# Patient Record
Sex: FEMALE | Race: WHITE | NOT HISPANIC OR LATINO | Employment: FULL TIME | URBAN - METROPOLITAN AREA
[De-identification: names, ages, dates, MRNs, and addresses within clinical notes are randomized per-mention and may not be internally consistent; named-entity substitution may affect disease eponyms.]

---

## 2017-03-06 NOTE — TELEPHONE ENCOUNTER
Was the patient seen in the last year in this department? Yes     Does patient have an active prescription for medications requested? No     Received Request Via: Pharmacy       Last seen 07/11/2016

## 2017-05-22 ENCOUNTER — OFFICE VISIT (OUTPATIENT)
Dept: MEDICAL GROUP | Facility: MEDICAL CENTER | Age: 62
End: 2017-05-22
Payer: COMMERCIAL

## 2017-05-22 VITALS
HEART RATE: 87 BPM | SYSTOLIC BLOOD PRESSURE: 116 MMHG | WEIGHT: 112 LBS | BODY MASS INDEX: 18.66 KG/M2 | TEMPERATURE: 98.2 F | HEIGHT: 65 IN | OXYGEN SATURATION: 97 % | DIASTOLIC BLOOD PRESSURE: 68 MMHG

## 2017-05-22 DIAGNOSIS — E78.5 DYSLIPIDEMIA: ICD-10-CM

## 2017-05-22 DIAGNOSIS — G47.9 SLEEP DISORDER: ICD-10-CM

## 2017-05-22 DIAGNOSIS — E55.9 HYPOVITAMINOSIS D: ICD-10-CM

## 2017-05-22 DIAGNOSIS — Z00.00 HEALTH CARE MAINTENANCE: ICD-10-CM

## 2017-05-22 PROCEDURE — 99214 OFFICE O/P EST MOD 30 MIN: CPT | Performed by: INTERNAL MEDICINE

## 2017-05-22 RX ORDER — ZOLPIDEM TARTRATE 5 MG/1
5 TABLET ORAL NIGHTLY PRN
Qty: 30 TAB | Refills: 0 | Status: SHIPPED | OUTPATIENT
Start: 2017-05-22 | End: 2017-07-13 | Stop reason: SDUPTHER

## 2017-05-22 NOTE — PROGRESS NOTES
CHIEF COMPLAINT  Insomnia    HPI  Patient is a 61 y.o. female patient who presents today for the following     Insomnia  The patient has a trouble to go to sleep.    Previous medication:  ambien  Current medication: triazolam, low dose, wants to go back to ambien.    Discussed sleep hygiene:    - Go to bed and wake up at consistent times whether work/school day or not: yes  - Keep room dark, quiet, and comfortable: yes  - Don't have naps: yes  - Avoid stimulant or caffeine use more than 4 hours after wake time: yes    - Avoid meal or exercise 2-3 hours prior to going to bed: yes    DYSLIPIDEMIA  Meds: none.  Diet: healthy  Exercise:  daily  BMI: 18  FH: unknown.    Vitamin D deficiency  The patient had low Vitamin D.  She was on Vit D supplement, OTC.      Reviewed PMH, PSH, FH, SH, ALL, HCM/IMM, no changes  Reviewed MEDS, no changes    Patient Active Problem List    Diagnosis Date Noted   • Health care maintenance 07/11/2016   • Dyslipidemia 07/11/2016   • Sleep disorder 12/26/2014   • Vitamin D deficiency disease 11/17/2013     CURRENT MEDICATIONS  Current Outpatient Prescriptions   Medication Sig Dispense Refill   • zolpidem (AMBIEN) 5 MG Tab Take 1 Tab by mouth at bedtime as needed for Sleep. 30 Tab 0   • triazolam (HALCION) 0.25 MG Tab Take 1/2 tab for 2 weeks, then 1/4 tab for 2 weeks 30 Tab 0     No current facility-administered medications for this visit.     ALLERGIES  Allergies: Review of patient's allergies indicates no known allergies.  PAST MEDICAL HISTORY  Past Medical History   Diagnosis Date   • OSTEOPOROSIS    • Nephrolithiasis 2011     SURGICAL HISTORY  She  has past surgical history that includes colonoscopy (2007).  SOCIAL HISTORY  Social History   Substance Use Topics   • Smoking status: Former Smoker -- 1.00 packs/day for 20 years     Quit date: 12/26/1995   • Smokeless tobacco: None   • Alcohol Use: No     Social History     Social History Narrative    Teacher, english as a second language.     "Lives with , .     Has to step-children, adult.      FAMILY HISTORY  Family History   Problem Relation Age of Onset   • Cancer Mother      lung   • Stroke Father    • Hyperlipidemia Neg Hx    • Heart Disease Neg Hx    • Hyperlipidemia Neg Hx    • Hypertension Neg Hx      No family status information on file.       ROS   Constitutional: Negative for fever, chills.  HENT: Negative for congestion, sore throat.  Eyes: Negative for blurred vision.   Respiratory: Negative for cough, shortness of breath.  Cardiovascular: Negative for chest pain, palpitations.   Gastrointestinal: Negative for heartburn, nausea, abdominal pain.   Genitourinary: Negative for dysuria.  Musculoskeletal: Negative for significant myalgias, back pain and joint pain.   Skin: Negative for rash and itching.   Neuro: Negative for dizziness, weakness and headaches.   Endo/Heme/Allergies: Does not bruise/bleed easily.   Psychiatric/Behavioral: Negative for depression, anxiety    PHYSICAL EXAM   Blood pressure 116/68, pulse 87, temperature 36.8 °C (98.2 °F), height 1.651 m (5' 5\"), weight 50.803 kg (112 lb), SpO2 97 %. Body mass index is 18.64 kg/(m^2).  General:  NAD, well appearing  HEENT:   NC/AT, PERRLA, EOMI, TMs are clear. Oropharyngeal mucosa is pink,  without lesions;  no cervical / supraclavicular  lymphadenopathy, no thyromegaly.    Cardiovascular: RRR.   No m/r/g. No carotid bruits .      Lungs:   CTAB, no w/r/r, no respiratory distress.  Abdomen: Soft, NT/ND + BS; no suprapubic tenderness; no masses or hepatosplenomegaly.  Extremities:  2+ DP and radial pulses bilaterally.  No c/c/e.   Skin:  Warm, dry.  No erythema. No rash.   Neurologic: Alert & oriented x 3.  No focal deficits.  Psychiatric:  Affect normal, mood normal, judgment normal.    LABS     Labs are reviewed and discussed with a patient    Lab Results   Component Value Date/Time    CHOLESTEROL,* 05/12/2016 09:30 AM    * 05/12/2016 09:30 AM    HDL 56 " 05/12/2016 09:30 AM    TRIGLYCERIDES 264* 05/12/2016 09:30 AM       Lab Results   Component Value Date/Time    SODIUM 138 05/12/2016 09:30 AM    POTASSIUM 4.2 05/12/2016 09:30 AM    CHLORIDE 105 05/12/2016 09:30 AM    CO2 27 05/12/2016 09:30 AM    GLUCOSE 80 05/12/2016 09:30 AM    BUN 10 05/12/2016 09:30 AM    CREATININE 0.68 05/12/2016 09:30 AM     Lab Results   Component Value Date/Time    ALKALINE PHOSPHATASE 82 05/12/2016 09:30 AM    AST(SGOT) 26 05/12/2016 09:30 AM    ALT(SGPT) 25 05/12/2016 09:30 AM    TOTAL BILIRUBIN 0.3 05/12/2016 09:30 AM      Lab Results   Component Value Date/Time    GLYCOHEMOGLOBIN 5.4 05/12/2016 09:30 AM     No results found for: TSH  Lab Results   Component Value Date/Time    FREE T-4 1.24 02/11/2014 06:34 AM      Ref. Range 5/7/2015 09:16 5/12/2016 09:30   25-Hydroxy   Vitamin D 25 :  ng/mL 14 (L) 12 (L)     Lab Results   Component Value Date/Time    WBC 5.1 05/12/2016 09:30 AM    RBC 4.66 05/12/2016 09:30 AM    HEMOGLOBIN 13.6 05/12/2016 09:30 AM    HEMATOCRIT 41.8 05/12/2016 09:30 AM    MCV 89.7 05/12/2016 09:30 AM    MCH 29.2 05/12/2016 09:30 AM    MCHC 32.5* 05/12/2016 09:30 AM    MPV 10.1 05/12/2016 09:30 AM    NEUTROPHILS-POLYS 47.7 11/12/2013 07:36 AM    LYMPHOCYTES 44.0* 11/12/2013 07:36 AM    MONOCYTES 6.7 11/12/2013 07:36 AM    EOSINOPHILS 1.1 11/12/2013 07:36 AM    BASOPHILS 0.5 11/12/2013 07:36 AM    HYPOCHROMIA 1+ 11/12/2013 07:36 AM        IMAGING     None    ASSESMENT AND PLAN        1. Sleep disorder  Ambien, 5 mg, dispensed 30 tablets.  Take one tablet as needed for sleep.   3.  Is alone, 0.25 mg, tapering    2. Dyslipidemia  Continue low-fat diet, daily exercise  - COMP METABOLIC PANEL; Future  - LIPID PROFILE; Future    3. Hypovitaminosis D  Continue over-the-counter vitamin D supplement  - VITAMIN D,25 HYDROXY; Future    4. Health care maintenance  Advised shingles shot and TDAP.   Advised to schedule Pap smear with the next 6 months.    Counseling:   -  Smoking:  Nonsmoker    Followup: Return in about 3 months (around 8/22/2017), or if symptoms worsen or fail to improve, for Short.    All questions are answered.    Please note that this dictation was created using voice recognition software, and that there might be errors of naty and possibly content.

## 2017-05-22 NOTE — MR AVS SNAPSHOT
"        Ramónmariano Gutiérrez   2017 10:40 AM   Office Visit   MRN: [id number]    Department:  [place] [address]   Dept Phone:  [phone]    Description:  Female : 1955   Provider:  Benji Selby M.D.           Allergies as of 2017     No Known Allergies      You were diagnosed with     Sleep disorder   [856289]       Dyslipidemia   [496067]       Hypovitaminosis D   [642899]         Vital Signs     Blood Pressure Pulse Temperature Height Weight Body Mass Index    116/68 mmHg 87 36.8 °C (98.2 °F) 1.651 m (5' 5\") 50.803 kg (112 lb) 18.64 kg/m2    Oxygen Saturation Smoking Status                97% Former Smoker          Basic Information     Date Of Birth Sex Race Ethnicity Preferred Language    1955 Female White Non- English      Your appointments     Aug 21, 2017 10:40 AM   Established Patient with Benji Selby M.D.   [facility])    [address]  [address]  [place] [id number] [phone]           You will be receiving a confirmation call a few days before your appointment from our automated call confirmation system.              Problem List              ICD-10-CM Priority Class Noted - Resolved    Vitamin D deficiency disease E55.9   2013 - Present    Sleep disorder G47.9   2014 - Present    Health care maintenance Z00.00   2016 - Present    Dyslipidemia E78.5   2016 - Present      Health Maintenance        Date Due Completion Dates    IMM DTaP/Tdap/Td Vaccine (1 - Tdap) 1974 ---    IMM ZOSTER VACCINE 2015 ---    PAP SMEAR 10/21/2016 10/21/2013, 2009    COLONOSCOPY 2017 (Done)    Override on 2007: Done    MAMMOGRAM 10/13/2017 10/13/2016, 10/8/2015, 6/3/2015 (Postponed), 2013, 2011, 2010, 2009, 2009, 2008, 2008, 2007, 2007, 2006    Override on 6/3/2015: Postponed            Current Immunizations     Influenza " TIV (IM) 10/15/2014    MMR Vaccine 1/12/2004, 12/8/2003      Below and/or attached are the medications your provider expects you to take. Review all of your home medications and newly ordered medications with your provider and/or pharmacist. Follow medication instructions as directed by your provider and/or pharmacist. Please keep your medication list with you and share with your provider. Update the information when medications are discontinued, doses are changed, or new medications (including over-the-counter products) are added; and carry medication information at all times in the event of emergency situations     Allergies:  No Known Allergies          Medications  Valid as of: May 22, 2017 - 11:04 AM    Generic Name Brand Name Tablet Size Instructions for use    Triazolam (Tab) HALCION 0.25 MG Take 1/2 tab for 2 weeks, then 1/4 tab for 2 weeks        Zolpidem Tartrate (Tab) AMBIEN 5 MG Take 1 Tab by mouth at bedtime as needed for Sleep.        .                 Medicines prescribed today were sent to:     [facility] [address] [place] [address]), [place]  [address] [address] [address] () [place] [address]    Phone: [phone] Fax: [phone]    Open 24 Hours?: No      Medication refill instructions:       If your prescription bottle indicates you have medication refills left, it is not necessary to call your provider’s office. Please contact your pharmacy and they will refill your medication.    If your prescription bottle indicates you do not have any refills left, you may request refills at any time through one of the following ways: The online Rezzie system (except Urgent Care), by calling your provider’s office, or by asking your pharmacy to contact your provider’s office with a refill request. Medication refills are processed only during regular business hours and may not be available until the next business day. Your provider may request additional information or to have a follow-up visit with you  prior to refilling your medication.   *Please Note: Medication refills are assigned a new Rx number when refilled electronically. Your pharmacy may indicate that no refills were authorized even though a new prescription for the same medication is available at the pharmacy. Please request the medicine by name with the pharmacy before contacting your provider for a refill.        Your To Do List     Future Labs/Procedures Complete By Expires    COMP METABOLIC PANEL  As directed 5/23/2018    LIPID PROFILE  As directed 5/23/2018    VITAMIN D,25 HYDROXY  As directed 5/23/2018         UI Robot Access Code: [email or web address]  Expires: 6/21/2017 11:04 AM    UI Robot  A secure, online tool to manage your health information     [employer]’s UI Robot® is a secure, online tool that connects you to your personalized health information from the privacy of your home -- day or night - making it very easy for you to manage your healthcare. Once the activation process is completed, you can even access your medical information using the UI Robot dayo, which is available for free in the Apple Dayo store or Google Play store.     UI Robot provides the following levels of access (as shown below):   My Chart Features   [facility]  Specialists [facility]  Urgent  Care Non-[facility]  Primary Care  Doctor   Email your healthcare team securely and privately 24/7 X X X    Manage appointments: schedule your next appointment; view details of past/upcoming appointments X      Request prescription refills. X      View recent personal medical records, including lab and immunizations X X X X   View health record, including health history, allergies, medications X X X X   Read reports about your outpatient visits, procedures, consult and ER notes X X X X   See your discharge summary, which is a recap of your hospital and/or ER visit that includes your diagnosis, lab results, and care plan. X X       How to register for UI Robot:  1. Go to   [email or web address].  2. Click on the Sign Up Now box, which takes you to the New Member Sign Up page. You will need to provide the following information:  a. Enter your [employer] Spirits Access Code exactly as it appears at the top of this page. (You will not need to use this code after you’ve completed the sign-up process. If you do not sign up before the expiration date, you must request a new code.)   b. Enter your date of birth.   c. Enter your home email address.   d. Click Submit, and follow the next screen’s instructions.  3. Create a Innovation Spirits ID. This will be your Innovation Spirits login ID and cannot be changed, so think of one that is secure and easy to remember.  4. Create a [employer] password. You can change your password at any time.  5. Enter your Password Reset Question and Answer. This can be used at a later time if you forget your password.   6. Enter your e-mail address. This allows you to receive e-mail notifications when new information is available in Innovation Spirits.  7. Click Sign Up. You can now view your health information.    For assistance activating your Innovation Spirits account, call [phone]

## 2017-07-13 RX ORDER — ZOLPIDEM TARTRATE 5 MG/1
TABLET ORAL
Qty: 30 TAB | Refills: 0 | Status: SHIPPED | OUTPATIENT
Start: 2017-07-13 | End: 2017-08-22 | Stop reason: SDUPTHER

## 2017-07-13 NOTE — TELEPHONE ENCOUNTER
Was the patient seen in the last year in this department? Yes     Does patient have an active prescription for medications requested? Yes     Received Request Via: Pharmacy     Last office visit: 05/22/2017  Last labs: 05/16/2016

## 2017-08-21 ENCOUNTER — TELEPHONE (OUTPATIENT)
Dept: MEDICAL GROUP | Facility: MEDICAL CENTER | Age: 62
End: 2017-08-21

## 2017-08-21 NOTE — TELEPHONE ENCOUNTER
ESTABLISHED PATIENT PRE-VISIT PLANNING     Note: Patient will not be contacted if there is no indication to call.     1.  Reviewed notes from the last few office visits within the medical group: Yes 05/22/2017    2.  If any orders were placed at last visit or intended to be done for this visit (i.e. 6 mos follow-up), do we have Results/Consult Notes?        •  Labs - Not completed        •  Imaging - Imaging was not ordered at last office visit.       •  Referrals - No referrals were ordered at last office visit.    3. Is this appointment scheduled as a Hospital Follow-Up? No    4.  Immunizations were updated in Epic using WebIZ?: Epic matches WebIZ       •  Web Iz Recommendations: FLU and TDAP    5.  Patient is due for the following Health Maintenance Topics:   Health Maintenance Due   Topic Date Due   • IMM DTaP/Tdap/Td Vaccine (1 - Tdap) 09/23/1974   • PAP SMEAR  10/21/2016   • COLONOSCOPY 01/01/2017   • IMM INFLUENZA (1) 09/01/2017       Please have patient sign records request for colonoscopy in letters.     6.  Patient was NOT informed to arrive 15 min prior to their scheduled appointment and bring in their medication bottles.

## 2017-08-21 NOTE — Clinical Note
[id number]  Benji Selby M.D.  [address]  [place] [address]  Fax: [phone]   Authorization for Release/Disclosure of   Protected Health Information   Name: EMIR LANDIS : 1955 SSN: [id number]   Address: [address] [place] [address]  [place] [address] Phone:    [phone] (home)    I authorize the entity listed below to release/disclose the PHI below to:   [facility]/Benji Selby M.D. and Benji Selby M.D.   Provider or Entity Name:                                                   Christiano Peterson M.D.     Address   [facility], [facility]   Phone:      Fax:  [phone]   Reason for request: continuity of care   Information to be released:    [XX  ] LAST COLONOSCOPY,  including any PATH REPORT and follow-up  [  ] LAST FIT/COLOGUARD RESULT [  ] LAST DEXA  [  ] LAST MAMMOGRAM  [  ] LAST PAP  [  ] LAST LABS [  ] RETINA EXAM REPORT  [  ] IMMUNIZATION RECORDS  [  ] Release all info      [  ] Check here and initial the line next to each item to release ALL health information INCLUDING  _____ Care and treatment for drug and / or alcohol abuse  _____ HIV testing, infection status, or AIDS  _____ Genetic Testing    DATES OF SERVICE OR TIME PERIOD TO BE DISCLOSED: _____________  I understand and acknowledge that:  * This Authorization may be revoked at any time by you in writing, except if your health information has already been used or disclosed.  * Your health information that will be used or disclosed as a result of you signing this authorization could be re-disclosed by the recipient. If this occurs, your re-disclosed health information may no longer be protected by State or Federal laws.  * You may refuse to sign this Authorization. Your refusal will not affect your ability to obtain treatment.  * This Authorization becomes effective upon signing and will  on (date) __________.      If no date is indicated, this Authorization will  one (1) year from the signature  date.    Name: Josette Gutiérrez    Signature:   Date:     8/22/2017       PLEASE FAX REQUESTED RECORDS BACK TO: [phone]

## 2017-08-22 ENCOUNTER — OFFICE VISIT (OUTPATIENT)
Dept: MEDICAL GROUP | Facility: MEDICAL CENTER | Age: 62
End: 2017-08-22
Payer: COMMERCIAL

## 2017-08-22 VITALS
OXYGEN SATURATION: 97 % | HEIGHT: 65 IN | HEART RATE: 81 BPM | BODY MASS INDEX: 20.09 KG/M2 | WEIGHT: 120.6 LBS | TEMPERATURE: 97.2 F | SYSTOLIC BLOOD PRESSURE: 116 MMHG | DIASTOLIC BLOOD PRESSURE: 68 MMHG

## 2017-08-22 DIAGNOSIS — Z12.11 SCREENING FOR MALIGNANT NEOPLASM OF COLON: ICD-10-CM

## 2017-08-22 DIAGNOSIS — G47.9 SLEEP DISORDER: ICD-10-CM

## 2017-08-22 DIAGNOSIS — Z00.00 HEALTH CARE MAINTENANCE: ICD-10-CM

## 2017-08-22 DIAGNOSIS — Z12.39 SCREENING FOR MALIGNANT NEOPLASM OF BREAST: ICD-10-CM

## 2017-08-22 PROCEDURE — 99214 OFFICE O/P EST MOD 30 MIN: CPT | Performed by: INTERNAL MEDICINE

## 2017-08-22 RX ORDER — ZOLPIDEM TARTRATE 5 MG/1
TABLET ORAL
Qty: 30 TAB | Refills: 2 | Status: SHIPPED
Start: 2017-08-22 | End: 2017-10-16 | Stop reason: SDUPTHER

## 2017-08-22 ASSESSMENT — PATIENT HEALTH QUESTIONNAIRE - PHQ9: CLINICAL INTERPRETATION OF PHQ2 SCORE: 0

## 2017-08-22 NOTE — PROGRESS NOTES
CHIEF COMPLAINT  Chief Complaint   Patient presents with   • Medication Refill   Insomnia    HPI  Patient is a 61 y.o. female patient who presents today for the following     Insomnia  The patient has a trouble to go to sleep.    Previous medication:  ambien, 5 mg HS, triazolam, 0.25 mg HS; needs refill  Current medication: triazolam, low dose, wants to go back to ambien.    Discussed sleep hygiene:    - Go to bed and wake up at consistent times whether work/school day or not: yes  - Keep room dark, quiet, and comfortable: yes  - Don't have naps: yes  - Avoid stimulant or caffeine use more than 4 hours after wake time: yes    - Avoid meal or exercise 2-3 hours prior to going to bed: yes    Reviewed PMH, PSH, FH, SH, ALL, HCM/IMM, no changes  Reviewed MEDS, no changes    Patient Active Problem List    Diagnosis Date Noted   • Health care maintenance 07/11/2016   • Dyslipidemia 07/11/2016   • Sleep disorder 12/26/2014   • Vitamin D deficiency disease 11/17/2013     CURRENT MEDICATIONS  Current Outpatient Prescriptions   Medication Sig Dispense Refill   • TRIAZOLAM PO Take  by mouth.     • zolpidem (AMBIEN) 5 MG Tab TAKE ONE TABLET BY MOUTH ONCE DAILY AT BEDTIME AS NEEDED FOR SLEEP 30 Tab 2   • triazolam (HALCION) 0.25 MG Tab Take 1/2 tab for 2 weeks, then 1/4 tab for 2 weeks 30 Tab 2     No current facility-administered medications for this visit.     ALLERGIES  Allergies: Review of patient's allergies indicates no known allergies.    PAST MEDICAL HISTORY  Past Medical History   Diagnosis Date   • OSTEOPOROSIS    • Nephrolithiasis 2011   • Insomnia      SURGICAL HISTORY  She  has past surgical history that includes colonoscopy (2007).    SOCIAL HISTORY  Social History   Substance Use Topics   • Smoking status: Former Smoker -- 1.00 packs/day for 20 years     Quit date: 12/26/1995   • Smokeless tobacco: Never Used   • Alcohol Use: No     Social History     Social History Narrative    Teacher, english as a second  "language.    Lives with , .     Has to step-children, adult.      FAMILY HISTORY  Family History   Problem Relation Age of Onset   • Cancer Mother      lung   • Stroke Father    • Hyperlipidemia Neg Hx    • Heart Disease Neg Hx    • Hyperlipidemia Neg Hx    • Hypertension Neg Hx      No family status information on file.     ROS   Constitutional: Negative for fever, chills.  HENT: Negative for congestion, sore throat.  Eyes: Negative for blurred vision.   Respiratory: Negative for cough, shortness of breath.  Cardiovascular: Negative for chest pain, palpitations.   Gastrointestinal: Negative for heartburn, nausea, abdominal pain.   Musculoskeletal: Negative for significant myalgias, back pain and joint pain.   Skin: Negative for rash and itching.   Neuro: Negative for dizziness, weakness and headaches.   Endo/Heme/Allergies: Does not bruise/bleed easily.   Psychiatric/Behavioral: Negative for depression, anxiety. And per HPI.    PHYSICAL EXAM   Blood pressure 116/68, pulse 81, temperature 36.2 °C (97.2 °F), height 1.651 m (5' 5\"), weight 54.704 kg (120 lb 9.6 oz), SpO2 97 %. Body mass index is 20.07 kg/(m^2).  General:  NAD, well appearing  HEENT:   NC/AT, PERRLA, EOMI, TMs are clear. Oropharyngeal mucosa is pink,  without lesions;  no cervical / supraclavicular  lymphadenopathy, no thyromegaly.    Cardiovascular: RRR.   No m/r/g. No carotid bruits .      Lungs:   CTAB, no w/r/r, no respiratory distress.  Abdomen: Soft, NT/ND + BS.  Extremities:  2+ DP and radial pulses bilaterally.  No c/c/e.   Skin:  Warm, dry.  No erythema. No rash.   Neurologic: Alert & oriented x 3.No focal deficits.  Psychiatric:  Affect normal, mood normal, judgment normal.    LABS     None    IMAGING     None    ASSESMENT AND PLAN        1. Sleep disorder  Refill:  - zolpidem (AMBIEN) 5 MG Tab; TAKE ONE TABLET BY MOUTH ONCE DAILY AT BEDTIME AS NEEDED FOR SLEEP  Dispense: 30 Tab; Refill: 2  - triazolam (HALCION) 0.25 MG Tab; " Take 1/2 tab for 2 weeks, then 1/4 tab for 2 weeks  Dispense: 30 Tab; Refill: 2    - Reviewed effects and side effects of medication, the patient verbalized understanding    2. Screening for malignant neoplasm of colon  - REFERRAL TO GI FOR COLONOSCOPY    3. Health care maintenance  UTD    Counseling:   - Smoking:  Nonsmoker    Followup: Return in about 3 months (around 11/22/2017) for Short.    All questions are answered.    Please note that this dictation was created using voice recognition software, and that there might be errors of naty and possibly content.

## 2017-10-06 ENCOUNTER — TELEPHONE (OUTPATIENT)
Dept: MEDICAL GROUP | Facility: MEDICAL CENTER | Age: 62
End: 2017-10-06

## 2017-10-06 NOTE — TELEPHONE ENCOUNTER
Phone Number Called: [phone] (home)       Message: Please discontinue triazolam. Patient states she is no longer taking it.     Left Message for patient to call back: yes

## 2017-10-16 ENCOUNTER — HOSPITAL ENCOUNTER (OUTPATIENT)
Dept: RADIOLOGY | Facility: MEDICAL CENTER | Age: 62
End: 2017-10-16
Attending: INTERNAL MEDICINE
Payer: COMMERCIAL

## 2017-10-16 ENCOUNTER — TELEPHONE (OUTPATIENT)
Dept: MEDICAL GROUP | Facility: MEDICAL CENTER | Age: 62
End: 2017-10-16

## 2017-10-16 DIAGNOSIS — Z12.39 SCREENING FOR MALIGNANT NEOPLASM OF BREAST: ICD-10-CM

## 2017-10-16 DIAGNOSIS — G47.9 SLEEP DISORDER: ICD-10-CM

## 2017-10-16 PROCEDURE — G0202 SCR MAMMO BI INCL CAD: HCPCS

## 2017-10-16 RX ORDER — ZOLPIDEM TARTRATE 10 MG/1
TABLET ORAL
Qty: 15 TAB | Status: SHIPPED | OUTPATIENT
Start: 2017-10-16 | End: 2018-04-12

## 2017-10-16 NOTE — TELEPHONE ENCOUNTER
VOICEMAIL  1. Caller Name: Josette Gutiérrez                      Call Back Number: [phone] (home)      2. Message: Pt called saying that her insurance does not cover the medication and the pharmacy only allowed her 15 pills. She would like to know if you could either switch her medication or you could approve her for a larger amount of tablets     3. Patient approves office to leave a detailed voicemail/MyChart message: N\A

## 2017-10-17 ENCOUNTER — TELEPHONE (OUTPATIENT)
Dept: MEDICAL GROUP | Facility: MEDICAL CENTER | Age: 62
End: 2017-10-17

## 2017-10-17 NOTE — TELEPHONE ENCOUNTER
Phone Number Called: [phone] (home)       Message:  informed of msg. I will still mail out letter.     Left Message for patient to call back: yes

## 2017-10-17 NOTE — TELEPHONE ENCOUNTER
----- Message from Benji Selby M.D. sent at 10/16/2017 10:10 PM PDT -----  Please, notify pt:  Mammogram was normal, and follow up in one year is recommended.      Thanks,   Dr. Scott

## 2018-03-08 DIAGNOSIS — G47.9 SLEEP DISORDER: ICD-10-CM

## 2018-04-09 NOTE — TELEPHONE ENCOUNTER
Phone Number Called: [phone] (home)      Message: Left message for patient that she need an appointment to get medciation refilled.     Left Message for patient to call back: yes

## 2018-04-12 ENCOUNTER — OFFICE VISIT (OUTPATIENT)
Dept: MEDICAL GROUP | Facility: MEDICAL CENTER | Age: 63
End: 2018-04-12
Payer: COMMERCIAL

## 2018-04-12 VITALS
RESPIRATION RATE: 16 BRPM | HEART RATE: 68 BPM | DIASTOLIC BLOOD PRESSURE: 64 MMHG | BODY MASS INDEX: 20.43 KG/M2 | HEIGHT: 65 IN | SYSTOLIC BLOOD PRESSURE: 112 MMHG | OXYGEN SATURATION: 97 % | TEMPERATURE: 98.2 F | WEIGHT: 122.6 LBS

## 2018-04-12 DIAGNOSIS — E78.5 DYSLIPIDEMIA: ICD-10-CM

## 2018-04-12 DIAGNOSIS — Z79.899 CONTROLLED SUBSTANCE AGREEMENT SIGNED: Primary | ICD-10-CM

## 2018-04-12 DIAGNOSIS — G47.9 SLEEP DISORDER: ICD-10-CM

## 2018-04-12 DIAGNOSIS — Z00.00 HEALTH CARE MAINTENANCE: ICD-10-CM

## 2018-04-12 DIAGNOSIS — E55.9 VITAMIN D DEFICIENCY DISEASE: ICD-10-CM

## 2018-04-12 PROCEDURE — 99214 OFFICE O/P EST MOD 30 MIN: CPT | Performed by: INTERNAL MEDICINE

## 2018-04-12 NOTE — PROGRESS NOTES
CC: Josette Gutiérrez is a 62 y.o. female who presents for the following     Insomnia  The patient has a trouble to go to sleep.    Previous rx: ambien  Current rx:  triazolam, 0.25 mg HS; needs refill.     Discussed sleep hygiene:    - Go to bed and wake up at consistent times whether work/school day or not: yes  - Keep room dark, quiet, and comfortable: yes  - Don't have naps: yes  - Avoid stimulant or caffeine use more than 4 hours after wake time: yes    - Avoid meal or exercise 2-3 hours prior to going to bed: yes     DYSLIPIDEMIA  Meds: none.  Diet: healthy  Exercise:  daily  BMI: 20  FH: unknown.     Ref. Range 5/12/2016    Cholesterol,Tot Latest Ref Range: 100 - 199 mg/dL 233 (H)   Triglycerides Latest Ref Range: 0 - 149 mg/dL 264 (H)   HDL Latest Ref Range: >=40 mg/dL 56   LDL Latest Ref Range: <100 mg/dL 124 (H)     Hypovitaminosis D  The patient had low vitamin D level (5/2016) at 12.  Vitamin D supplements: 2000 units QD, OTC    Current Outpatient Prescriptions   Medication Sig Dispense Refill   • zolpidem (AMBIEN) 10 MG Tab TAKE ONE TABLET BY MOUTH ONCE DAILY AT BEDTIME AS NEEDED FOR SLEEP 15 Tab 0R   • TRIAZOLAM PO Take  by mouth.       No current facility-administered medications for this visit.      She  has a past medical history of Insomnia; Nephrolithiasis (2011); and OSTEOPOROSIS. She also has no past medical history of Breast cancer (CMS-HCC).  She  has a past surgical history that includes colonoscopy (2007).  Social History   Substance Use Topics   • Smoking status: Former Smoker     Packs/day: 1.00     Years: 20.00     Quit date: 12/26/1995   • Smokeless tobacco: Never Used   • Alcohol use No     Social History     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     Has to step-children, adult.      Family History   Problem Relation Age of Onset   • Cancer Mother      lung   • Stroke Father    • Hyperlipidemia Neg Hx    • Heart Disease Neg Hx   "  • Hypertension Neg Hx      Family Status   Relation Status   • Mother    • Father    • Neg Hx      ROS   Taking supplements to help mood or symptoms: yes  Using alcohol or other substances to help mood or symptoms: no  No polydipsia, polyuria.  No temperature intolerance.  No bowel changes  Denies symptoms of jonatan: grandiosity, euphoria, need for little or no sleep, rapid pressured speech, spending sprees, reckless or risky behavior, hypersexual behavior.   No visual or auditory hallucinations.    PHYSICAL EXAM   Blood pressure 112/64, pulse 68, temperature 36.8 °C (98.2 °F), resp. rate 16, height 1.651 m (5' 5\"), weight 55.6 kg (122 lb 9.6 oz), SpO2 97 %.  General: normal speech pattern and content   Clothing and grooming normal.  Behavior: no psychomotor abnormalities or impulsivity  Eye contact: good   Affect: normal  Though content: normal insight and reasoning, no evidence of psychotic process.   Neck/Thyroid: No adenopathy, no palpable thyroid nodules.  Lungs: CTAB  Heart: RRR no ectopy  Neuro: Gait normal. Reflexes normal and symmetric. Sensation grossly intact        Abnormal findings: none    ASSESMENT AND PLAN     1. Sleep disorder  Refill:  - triazolam (HALCION) 0.25 MG Tab; Take 1 tab HS prn  Dispense: 30 Tab; Refill: 2  2. Controlled substance agreement signed  - Controlled Substance Treatment Agreement    Obtained and reviewed patient utilization report from [facility] pharmacy database on 4/12/2018 11:18 AM  prior to writing prescription for controlled substance II, III or IV per [place] bill . Based on assessment of the report, the prescription is medically necessary.     3. Dyslipidemia  No medications, advised low-fat diet, daily exercise.   BMI is low  - COMP METABOLIC PANEL; Future  - LIPID PROFILE; Future    4. Vitamin D deficiency disease  - VITAMIN D,25 HYDROXY; Future  Continue current supplement, pending labs.    5. Health care maintenance  She will schedule Pap smear with next " office visit in 3 months    Smoking Counseling: Nonsmoker    Follow up: in 3 months    Please note that this dictation was created using voice recognition software. Despite all efforts, some minor grammar mistakes are possible.

## 2018-06-10 DIAGNOSIS — G47.9 SLEEP DISORDER: ICD-10-CM

## 2018-06-11 RX ORDER — ZOLPIDEM TARTRATE 10 MG/1
TABLET ORAL
Qty: 15 TAB | Refills: 0 | Status: SHIPPED
Start: 2018-06-11 | End: 2018-07-11

## 2018-07-05 ENCOUNTER — HOSPITAL ENCOUNTER (OUTPATIENT)
Dept: LAB | Facility: MEDICAL CENTER | Age: 63
End: 2018-07-05
Attending: INTERNAL MEDICINE
Payer: COMMERCIAL

## 2018-07-05 DIAGNOSIS — E78.5 DYSLIPIDEMIA: ICD-10-CM

## 2018-07-05 DIAGNOSIS — E55.9 VITAMIN D DEFICIENCY DISEASE: ICD-10-CM

## 2018-07-05 LAB — 25(OH)D3 SERPL-MCNC: 41 NG/ML (ref 30–100)

## 2018-07-05 PROCEDURE — 82306 VITAMIN D 25 HYDROXY: CPT

## 2018-07-05 PROCEDURE — 80053 COMPREHEN METABOLIC PANEL: CPT

## 2018-07-05 PROCEDURE — 36415 COLL VENOUS BLD VENIPUNCTURE: CPT

## 2018-07-05 PROCEDURE — 80061 LIPID PANEL: CPT

## 2018-07-06 LAB
ALBUMIN SERPL BCP-MCNC: 3.9 G/DL (ref 3.2–4.9)
ALBUMIN/GLOB SERPL: 1.5 G/DL
ALP SERPL-CCNC: 76 U/L (ref 30–99)
ALT SERPL-CCNC: 14 U/L (ref 2–50)
ANION GAP SERPL CALC-SCNC: 6 MMOL/L (ref 0–11.9)
AST SERPL-CCNC: 18 U/L (ref 12–45)
BILIRUB SERPL-MCNC: 0.3 MG/DL (ref 0.1–1.5)
BUN SERPL-MCNC: 15 MG/DL (ref 8–22)
CALCIUM SERPL-MCNC: 8.8 MG/DL (ref 8.5–10.5)
CHLORIDE SERPL-SCNC: 106 MMOL/L (ref 96–112)
CHOLEST SERPL-MCNC: 151 MG/DL (ref 100–199)
CO2 SERPL-SCNC: 27 MMOL/L (ref 20–33)
CREAT SERPL-MCNC: 0.89 MG/DL (ref 0.5–1.4)
GLOBULIN SER CALC-MCNC: 2.6 G/DL (ref 1.9–3.5)
GLUCOSE SERPL-MCNC: 75 MG/DL (ref 65–99)
HDLC SERPL-MCNC: 85 MG/DL
LDLC SERPL CALC-MCNC: 57 MG/DL
POTASSIUM SERPL-SCNC: 3.8 MMOL/L (ref 3.6–5.5)
PROT SERPL-MCNC: 6.5 G/DL (ref 6–8.2)
SODIUM SERPL-SCNC: 139 MMOL/L (ref 135–145)
TRIGL SERPL-MCNC: 46 MG/DL (ref 0–149)

## 2018-07-12 ENCOUNTER — OFFICE VISIT (OUTPATIENT)
Dept: MEDICAL GROUP | Facility: MEDICAL CENTER | Age: 63
End: 2018-07-12
Payer: COMMERCIAL

## 2018-07-12 VITALS
WEIGHT: 123.24 LBS | TEMPERATURE: 98 F | OXYGEN SATURATION: 97 % | HEART RATE: 80 BPM | DIASTOLIC BLOOD PRESSURE: 62 MMHG | HEIGHT: 65 IN | SYSTOLIC BLOOD PRESSURE: 102 MMHG | BODY MASS INDEX: 20.53 KG/M2

## 2018-07-12 DIAGNOSIS — Z79.899 CONTROLLED SUBSTANCE AGREEMENT SIGNED: ICD-10-CM

## 2018-07-12 DIAGNOSIS — Z00.00 HEALTH CARE MAINTENANCE: ICD-10-CM

## 2018-07-12 DIAGNOSIS — E78.5 DYSLIPIDEMIA: ICD-10-CM

## 2018-07-12 DIAGNOSIS — G47.9 SLEEP DISORDER: ICD-10-CM

## 2018-07-12 PROCEDURE — 99214 OFFICE O/P EST MOD 30 MIN: CPT | Performed by: INTERNAL MEDICINE

## 2018-07-12 NOTE — PROGRESS NOTES
CHIEF COMPLAINT  Chief Complaint   Patient presents with   • Follow-Up     LAbs   • Medication Refill   Insomnia    HPI  Patient is a 62 y.o. female patient who presents today for the following     Insomnia  The patient has a trouble to go to sleep.    Previous rx: ambien  Current rx:  triazolam, 0.25 mg HS; needs refill.     Discussed sleep hygiene:    - Go to bed and wake up at consistent times whether work/school day or not: yes  - Keep room dark, quiet, and comfortable: yes  - Don't have naps: yes  - Avoid stimulant or caffeine use more than 4 hours after wake time: yes    - Avoid meal or exercise 2-3 hours prior to going to bed: yes     DYSLIPIDEMIA  Meds: none.  Diet: healthy  Exercise:  daily  BMI: 20  FH: unknown.     Reviewed PMH, PSH, FH, SH, ALL, HCM/IMM, no changes  Reviewed MEDS, no changes    Patient Active Problem List    Diagnosis Date Noted   • Controlled substance agreement signed 04/12/2018   • Health care maintenance 07/11/2016   • Dyslipidemia 07/11/2016   • Sleep disorder 12/26/2014     CURRENT MEDICATIONS  Current Outpatient Prescriptions   Medication Sig Dispense Refill   • triazolam (HALCION) 0.25 MG Tab Take 1 tab HS prn 30 Tab 2     No current facility-administered medications for this visit.      ALLERGIES  Allergies: Patient has no known allergies.  PAST MEDICAL HISTORY  Past Medical History:   Diagnosis Date   • Nephrolithiasis 2011   • Insomnia    • OSTEOPOROSIS      SURGICAL HISTORY  She  has a past surgical history that includes colonoscopy (2007).  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     Has to step-children, adult.      FAMILY HISTORY  Family History   Problem Relation Age of Onset   • Cancer  "Mother      lung   • Stroke Father    • Hyperlipidemia Neg Hx    • Heart Disease Neg Hx    • Hypertension Neg Hx      Family Status   Relation Status   • Mother    • Father    • Neg Hx      ROS   Constitutional: Negative for fever, chills.  Eyes: Negative for vision problems.   Respiratory: Negative for shortness of breath.  Cardiovascular: Negative for chest pain, palpitations.   Gastrointestinal: Negative for heartburn, constipation.   Genitourinary: Negative for urinary problems.  Musculoskeletal: Negative for significant myalgias.   Skin: Negative for rash.   Neuro: Negative for dizziness, headaches.   Endo/Heme/Allergies: Does not bruise/bleed easily.   Psychiatric/Behavioral: Negative for depression. And per HPI.    PHYSICAL EXAM   Blood pressure 102/62, pulse 80, temperature 36.7 °C (98 °F), height 1.651 m (5' 5\"), weight 55.9 kg (123 lb 3.8 oz), SpO2 97 %. Body mass index is 20.51 kg/m².  General:  NAD, well appearing  HEENT:   NC/AT, PERRLA, EOMI, TMs are clear. Oropharyngeal mucosa is pink,  without lesions;  no cervical / supraclavicular  lymphadenopathy, no thyromegaly.    Cardiovascular: RRR.   No m/r/g. No carotid bruits .      Lungs:   CTAB, no w/r/r, no respiratory distress.  Abdomen: Soft, NT/ND + BS; no suprapubic tenderness; no masses or hepatosplenomegaly.  Extremities:  2+ DP and radial pulses bilaterally.  No c/c/e.   Skin:  Warm, dry.  No erythema. No rash.   Neurologic: Alert & oriented x 3. No focal deficits.  Psychiatric:  Affect normal, mood normal, judgment normal.    LABS     Labs are reviewed and discussed with a patient  Lab Results   Component Value Date/Time    CHOLSTRLTOT 151 07/05/2018 08:00 AM    LDL 57 07/05/2018 08:00 AM    HDL 85 07/05/2018 08:00 AM    TRIGLYCERIDE 46 07/05/2018 08:00 AM       Lab Results   Component Value Date/Time    SODIUM 139 07/05/2018 08:00 AM    POTASSIUM 3.8 07/05/2018 08:00 AM    CHLORIDE 106 07/05/2018 08:00 AM    CO2 27 07/05/2018 08:00 AM    GLUCOSE " 75 07/05/2018 08:00 AM    BUN 15 07/05/2018 08:00 AM    CREATININE 0.89 07/05/2018 08:00 AM     Lab Results   Component Value Date/Time    ALKPHOSPHAT 76 07/05/2018 08:00 AM    ASTSGOT 18 07/05/2018 08:00 AM    ALTSGPT 14 07/05/2018 08:00 AM    TBILIRUBIN 0.3 07/05/2018 08:00 AM      Lab Results   Component Value Date/Time    HBA1C 5.4 05/12/2016 09:30 AM     No results found for: TSH  Lab Results   Component Value Date/Time    FREET4 1.24 02/11/2014 06:34 AM       Lab Results   Component Value Date/Time    WBC 5.1 05/12/2016 09:30 AM    RBC 4.66 05/12/2016 09:30 AM    HEMOGLOBIN 13.6 05/12/2016 09:30 AM    HEMATOCRIT 41.8 05/12/2016 09:30 AM    MCV 89.7 05/12/2016 09:30 AM    MCH 29.2 05/12/2016 09:30 AM    MCHC 32.5 (L) 05/12/2016 09:30 AM    MPV 10.1 05/12/2016 09:30 AM    NEUTSPOLYS 47.7 11/12/2013 07:36 AM    LYMPHOCYTES 44.0 (H) 11/12/2013 07:36 AM    MONOCYTES 6.7 11/12/2013 07:36 AM    EOSINOPHILS 1.1 11/12/2013 07:36 AM    BASOPHILS 0.5 11/12/2013 07:36 AM    HYPOCHROMIA 1+ 11/12/2013 07:36 AM      IMAGING     None    ASSESMENT AND PLAN        1. Sleep disorder  - triazolam (HALCION) 0.25 MG Tab; Take 1 tab HS prn  Dispense: 30 Tab; Refill: 2  2. Controlled substance agreement signed  - triazolam (HALCION) 0.25 MG Tab; Take 1 tab HS prn  Dispense: 30 Tab; Refill: 2  - Reviewed effects and side effects of medication, the patient verbalized understanding  Obtained and reviewed patient utilization report from [facility] pharmacy database on 7/12/2018 11:02 AM  prior to writing prescription for controlled substance II, III or IV per [place] bill . Based on assessment of the report, the prescription is medically necessary.     3. Dyslipidemia  Resolved with diet exercise and weight control    4. Health care maintenance  Pending Pap smear    Counseling:   - Smoking:  Nonsmoker    Followup: Return in about 2 months (around 9/12/2018) for Short.    All questions are answered.    Please note that this  dictation was created using voice recognition software, and that there might be errors of naty and possibly content.

## 2018-07-12 NOTE — NON-PROVIDER
Mammo due this fall  Pap more than 3 years ago, patient would like to wait until next year to get    Insomnia  Feels controlled at this time, sleeps better when she's not working, would like to cease taking sleeping medications.  Reviewed sleep hygiene    Hypovitaminosis D  Resolved with supplementation  Takes Vit D 2000 IU daily    Hypercholestemia  Resolved with change in diet

## 2018-10-11 ENCOUNTER — OFFICE VISIT (OUTPATIENT)
Dept: MEDICAL GROUP | Facility: MEDICAL CENTER | Age: 63
End: 2018-10-11
Payer: COMMERCIAL

## 2018-10-11 ENCOUNTER — HOSPITAL ENCOUNTER (OUTPATIENT)
Facility: MEDICAL CENTER | Age: 63
End: 2018-10-11
Attending: INTERNAL MEDICINE
Payer: COMMERCIAL

## 2018-10-11 VITALS
HEIGHT: 65 IN | HEART RATE: 87 BPM | OXYGEN SATURATION: 97 % | SYSTOLIC BLOOD PRESSURE: 102 MMHG | DIASTOLIC BLOOD PRESSURE: 64 MMHG | TEMPERATURE: 98.8 F | WEIGHT: 117.5 LBS | BODY MASS INDEX: 19.58 KG/M2

## 2018-10-11 DIAGNOSIS — E78.5 DYSLIPIDEMIA: ICD-10-CM

## 2018-10-11 DIAGNOSIS — Z01.419 WELL FEMALE EXAM WITH ROUTINE GYNECOLOGICAL EXAM: ICD-10-CM

## 2018-10-11 DIAGNOSIS — Z00.00 HEALTH CARE MAINTENANCE: ICD-10-CM

## 2018-10-11 DIAGNOSIS — Z12.4 SCREENING FOR MALIGNANT NEOPLASM OF CERVIX: ICD-10-CM

## 2018-10-11 DIAGNOSIS — N95.2 ATROPHIC VAGINITIS: ICD-10-CM

## 2018-10-11 DIAGNOSIS — Z79.899 CONTROLLED SUBSTANCE AGREEMENT SIGNED: ICD-10-CM

## 2018-10-11 DIAGNOSIS — G47.9 SLEEP DISORDER: ICD-10-CM

## 2018-10-11 DIAGNOSIS — Z12.31 ENCOUNTER FOR SCREENING MAMMOGRAM FOR MALIGNANT NEOPLASM OF BREAST: ICD-10-CM

## 2018-10-11 PROCEDURE — 99000 SPECIMEN HANDLING OFFICE-LAB: CPT | Performed by: INTERNAL MEDICINE

## 2018-10-11 PROCEDURE — 99396 PREV VISIT EST AGE 40-64: CPT | Performed by: INTERNAL MEDICINE

## 2018-10-11 PROCEDURE — 87624 HPV HI-RISK TYP POOLED RSLT: CPT

## 2018-10-11 PROCEDURE — 88175 CYTOPATH C/V AUTO FLUID REDO: CPT

## 2018-10-11 NOTE — PROGRESS NOTES
CHIEF COMPLIANT:  Josette Gutiérrez is a 63 y.o. female who presents for annual exam    Pt has GYN provider: no    Recommendations:  Regular exercise at least 4 days a week  Diet: advised balanced diet  Dental exam at least 1-2 times per year  Sunscreen use: advised    Immunizations:  TdaP: Advised  Shingless vaccine: 2016, advised shingrix    Colonoscopy: 9/2017 need repeat after 3-year    GYN  Last PAP:   2013, normal   Abnormal PAP: no  Last Mammography: 10/2017     Postmenopausal  Denies hot flushes, sweating, vaginal dryness, mood swings.     Insomnia  The patient had a trouble to go to sleep, improved, did not use medication for the last 3 months.  She still has triazolam supply.  Discussed sleep hygiene:    - Go to bed and wake up at consistent times whether work/school day or not: yes  - Keep room dark, quiet, and comfortable: yes  - Don't have naps: yes  - Avoid stimulant or caffeine use more than 4 hours after wake time: yes    - Avoid meal or exercise 2-3 hours prior to going to bed: yes     DYSLIPIDEMIA  Meds: none.  Diet: healthy  Exercise: daily  BMI: 19  FH: unknown.     CURRENT MEDICATIONS  Current Outpatient Prescriptions   Medication Sig Dispense Refill   • triazolam (HALCION) 0.25 MG Tab Take 1 tab HS prn 30 Tab 2     No current facility-administered medications for this visit.      ALLERGIES  Allergies: Patient has no known allergies.  PAST MEDICAL HISTORY  She  has a past medical history of Insomnia; Nephrolithiasis (2011); and OSTEOPOROSIS. She also has no past medical history of Breast cancer (HCC).  SURGICAL HISTORY  She  has a past surgical history that includes colonoscopy (2007).  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     Has to step-children, adult.      FAMILY HISTORY  Family History   Problem Relation Age of Onset   • Cancer Mother         lung   • Stroke Father    • Hyperlipidemia Neg Hx    • Heart Disease Neg Hx    • Hypertension Neg Hx      Family Status   Relation Status   • Mo (Not Specified)   • Fa (Not Specified)   • Neg Hx (Not Specified)     REVIEW OF SYSTEMS  Constitutional: Denies fever, chills, or sweats  Skin: negative for rash, scaling, itching, pigmentation, hair or nail changes.  Eyes: negative for visual blurring, double vision, eye pain, floaters and discharge from eyes  ENT: negative for tinnitus, vertigo, bleeding gums, dental problem and hoarseness, frequent URI's, sinus trouble, persistent sore throat  Respiratory: negative for persistent cough, hemoptysis, dyspnea, recurrent pneumonia or bronchitis, asthma and wheezing  Cardiovascular: negative for palpitations, tachycardia, irregular heart beat, chest pain, or peripheral edema.  Breast: Denies breast tenderness, mass, discharge, changes in size or contour, or abnormal cyclic discomfort.  Gastrointestinal: negative for poor appetite, dysphagia, nausea, heartburn or reflux, abdominal pain, hemorrhoids, constipation or diarrhea  Genitourinary: negative for dysuria, frequency, hesitancy, incontinence, sexually transmitted disease, abnormal vaginal discharge/bleeding, dysparunia   Musculoskeletal: negative for joint swelling and muscle pain/ soreness  Neuro: negative for migraine headaches, involuntary movements or tremor  Psychiatric: negative for excessive alcohol consumption or illegal drug use, sleep problems, anxiety, depression, sexual difficulties  Hematologic/Lymphatic/Immunologic: negative for anemia, unusual bruising, swollen glands, HIV risk factors  Endocrine: negative for temperature intolerance, polydipsia, polyuria.     PHYSICAL EXAMINATION:  Blood pressure 102/64, pulse 87, temperature 37.1 °C (98.8 °F), temperature source  "Temporal, height 1.651 m (5' 5\"), weight 53.3 kg (117 lb 8.1 oz), SpO2 97 %, not currently breastfeeding.  Body mass index is 19.55 kg/m².  Wt Readings from Last 4 Encounters:   10/11/18 53.3 kg (117 lb 8.1 oz)   07/12/18 55.9 kg (123 lb 3.8 oz)   04/12/18 55.6 kg (122 lb 9.6 oz)   08/22/17 54.7 kg (120 lb 9.6 oz)     General appearance:healthy, well developed, well nourished, well-groomed  Psych: alert, no distress, cooperative. Eye contact good, speech goal directed. Affect calm.   Eyes: conjunctivae and sclerae normal, lids and lashes normal, EOMI, PERRLA  ENT: Ears: external ears normal to inspection, canals clear. TMs pearly gray with normal light reflex and anatomic landmarks, Nose/Sinuses: nose shows no deformity, asymmetry, or inflammation, nasal mucosa normal, no sinus tenderness,   Oropharynx: lips normal without lesions, buccal mucosa normal, gums healthy, teeth intact, non-carious, palate normal, tongue midline and normal  Neck: no asymmetry, masses, adenopathy. Thyroid normal to palpation, carotids without bruits, no jugular venous distention  Lungs: clear to auscultation with good excursion, Normal respiratory rate. Chest symmetric no chest wall tenderness.  Cardiovascular: Regular rate and rhythm, no murmur.  No peripheral edema  Abdomen:  Soft, non-tender, no masses, HSM or palpable renal abnormalities. Bowel sounds normal, no bruits heard. No CVAT.  Musculoskeletal: no evidence of joint effusion, ROM of all joints is normal, no crepitation detected, strength grossly normal UE and LE, proximal and distal motor groups. No deformities present  Lymphatic: None significantly enlarged supraclavicular, axillary, or inguinal  Skin: color normal, vascularity normal, no rashes or suspicious lesions, no evidence of bleeding or bruising  Neuro: speech normal, mental status intact, gait grossly normal, muscle tone normal.  GYN: No suprapubic tenderness.  Perineum and external genitalia normal without rash. "   Vagina with without discharge, atrophic mucosa.  Cervix w/o visible lesions or discharge. No CMT  Uterus normal size, non-tender, no masses,   Adnexa w/o mass or tenderness.   PAP smear was obtained.    LABS    Labs are reviewed and discussed with a patient  Lab Results   Component Value Date/Time    CHOLSTRLTOT 151 07/05/2018 08:00 AM    LDL 57 07/05/2018 08:00 AM    HDL 85 07/05/2018 08:00 AM    TRIGLYCERIDE 46 07/05/2018 08:00 AM       Lab Results   Component Value Date/Time    SODIUM 139 07/05/2018 08:00 AM    POTASSIUM 3.8 07/05/2018 08:00 AM    CHLORIDE 106 07/05/2018 08:00 AM    CO2 27 07/05/2018 08:00 AM    GLUCOSE 75 07/05/2018 08:00 AM    BUN 15 07/05/2018 08:00 AM    CREATININE 0.89 07/05/2018 08:00 AM     Lab Results   Component Value Date/Time    ALKPHOSPHAT 76 07/05/2018 08:00 AM    ASTSGOT 18 07/05/2018 08:00 AM    ALTSGPT 14 07/05/2018 08:00 AM    TBILIRUBIN 0.3 07/05/2018 08:00 AM      Lab Results   Component Value Date/Time    HBA1C 5.4 05/12/2016 09:30 AM     No results found for: TSH  Lab Results   Component Value Date/Time    FREET4 1.24 02/11/2014 06:34 AM     Lab Results   Component Value Date/Time    WBC 5.1 05/12/2016 09:30 AM    RBC 4.66 05/12/2016 09:30 AM    HEMOGLOBIN 13.6 05/12/2016 09:30 AM    HEMATOCRIT 41.8 05/12/2016 09:30 AM    MCV 89.7 05/12/2016 09:30 AM    MCH 29.2 05/12/2016 09:30 AM    MCHC 32.5 (L) 05/12/2016 09:30 AM    MPV 10.1 05/12/2016 09:30 AM    NEUTSPOLYS 47.7 11/12/2013 07:36 AM    LYMPHOCYTES 44.0 (H) 11/12/2013 07:36 AM    MONOCYTES 6.7 11/12/2013 07:36 AM    EOSINOPHILS 1.1 11/12/2013 07:36 AM    BASOPHILS 0.5 11/12/2013 07:36 AM    HYPOCHROMIA 1+ 11/12/2013 07:36 AM      ASSESSMENT/PLAN    1. Well female exam with routine gynecological exam  - THINPREP PAP WITH HPV; Future    2. Screening for malignant neoplasm of cervix  - THINPREP PAP WITH HPV; Future    3. Health care maintenance  Advised Tdap    4. Encounter for screening mammogram for malignant neoplasm of  breast  - MA-SCREEN MAMMO W/CAD-BILAT; Future    5. Dyslipidemia  Controlled, continue current treatment    6. Sleep disorder  Controlled, no current medications    7. Controlled substance agreement signed  Reviewed    8. Atrophic vaginitis  Found on exam    Smoking Counseling: Nonsmoker    Next office visit is due in  1 year    All questions are answered.     CODING: ONLY PREVENTATIVE, NO EXTRA CHARGE    Please note that this dictation was created using voice recognition software. Despite all efforts, some minor grammar mistakes are possible.

## 2018-10-12 DIAGNOSIS — Z01.419 WELL FEMALE EXAM WITH ROUTINE GYNECOLOGICAL EXAM: ICD-10-CM

## 2018-10-12 DIAGNOSIS — Z12.4 SCREENING FOR MALIGNANT NEOPLASM OF CERVIX: ICD-10-CM

## 2018-10-16 LAB
CYTOLOGY REG CYTOL: ABNORMAL
HPV HR 12 DNA CVX QL NAA+PROBE: POSITIVE
HPV16 DNA SPEC QL NAA+PROBE: NEGATIVE
HPV18 DNA SPEC QL NAA+PROBE: NEGATIVE
SPECIMEN SOURCE: ABNORMAL

## 2018-10-18 ENCOUNTER — TELEPHONE (OUTPATIENT)
Dept: MEDICAL GROUP | Facility: MEDICAL CENTER | Age: 63
End: 2018-10-18

## 2018-10-18 NOTE — TELEPHONE ENCOUNTER
1. Caller Name: Josette                                         Call Back Number: [phone] (home)       Patient approves a detailed voicemail message: no    Pt would like more information on the positive HPV results and what she should do. Please advise.

## 2018-10-22 ENCOUNTER — HOSPITAL ENCOUNTER (OUTPATIENT)
Dept: RADIOLOGY | Facility: MEDICAL CENTER | Age: 63
End: 2018-10-22
Attending: INTERNAL MEDICINE
Payer: COMMERCIAL

## 2018-10-22 DIAGNOSIS — Z12.31 ENCOUNTER FOR SCREENING MAMMOGRAM FOR MALIGNANT NEOPLASM OF BREAST: ICD-10-CM

## 2018-10-22 PROCEDURE — 77067 SCR MAMMO BI INCL CAD: CPT

## 2019-01-17 ENCOUNTER — OFFICE VISIT (OUTPATIENT)
Dept: MEDICAL GROUP | Facility: MEDICAL CENTER | Age: 64
End: 2019-01-17
Payer: COMMERCIAL

## 2019-01-17 VITALS
HEIGHT: 65 IN | DIASTOLIC BLOOD PRESSURE: 62 MMHG | BODY MASS INDEX: 19.99 KG/M2 | HEART RATE: 82 BPM | SYSTOLIC BLOOD PRESSURE: 102 MMHG | WEIGHT: 120 LBS | TEMPERATURE: 97.8 F | OXYGEN SATURATION: 97 %

## 2019-01-17 DIAGNOSIS — Z00.00 HEALTH CARE MAINTENANCE: ICD-10-CM

## 2019-01-17 DIAGNOSIS — E78.5 DYSLIPIDEMIA: ICD-10-CM

## 2019-01-17 DIAGNOSIS — K63.5 POLYP OF COLON, UNSPECIFIED PART OF COLON, UNSPECIFIED TYPE: ICD-10-CM

## 2019-01-17 DIAGNOSIS — N95.2 ATROPHIC VAGINITIS: ICD-10-CM

## 2019-01-17 DIAGNOSIS — R87.810 CERVICAL HIGH RISK HPV (HUMAN PAPILLOMAVIRUS) TEST POSITIVE: ICD-10-CM

## 2019-01-17 DIAGNOSIS — G47.9 SLEEP DISORDER: ICD-10-CM

## 2019-01-17 PROBLEM — Z79.899 CONTROLLED SUBSTANCE AGREEMENT SIGNED: Status: RESOLVED | Noted: 2018-04-12 | Resolved: 2019-01-17

## 2019-01-17 PROCEDURE — 99214 OFFICE O/P EST MOD 30 MIN: CPT | Performed by: INTERNAL MEDICINE

## 2019-01-17 ASSESSMENT — PATIENT HEALTH QUESTIONNAIRE - PHQ9: CLINICAL INTERPRETATION OF PHQ2 SCORE: 0

## 2019-01-17 NOTE — PROGRESS NOTES
CHIEF COMPLAINT  Dyslipidemia    HPI  Patient is a 63 y.o. female patient who presents today for the following     DYSLIPIDEMIA  Meds: none.  Diet: healthy  Exercise:  daily  BMI: 20  FH: unknown.    Insomnia  The patient has a trouble to go to sleep.    Previous rx: ambien, triazolam  Current rx:  stopped all prescription meds.  Reviewed sleep hygiene:    - Go to bed and wake up at consistent times whether work/school day or not: yes  - Keep room dark, quiet, and comfortable: yes  - Don't have naps: yes  - Avoid stimulant or caffeine use more than 4 hours after wake time: yes    - Avoid meal or exercise 2-3 hours prior to going to bed: yes     Atrophic vaginitis, high-risk HPV infection  The patient has vaginal dryness/atrophy, no medications.   HPV was positive on Pap smear from 10/11/18.  Discussed about further follow-up.    Colon polyps  Found on colonoscopy from 9/25/17, requested follow-up after 5 years.  Denies blood in a stool, anorexia, WT loss.    Reviewed PMH, PSH, FH, SH, ALL, HCM/IMM, no changes  Reviewed MEDS, no changes    Patient Active Problem List    Diagnosis Date Noted   • Cervical high risk HPV (human papillomavirus) test positive 01/17/2019   • Polyp of colon 01/17/2019   • Atrophic vaginitis 10/11/2018   • Health care maintenance 07/11/2016   • Dyslipidemia 07/11/2016   • Sleep disorder 12/26/2014     CURRENT MEDICATIONS  No current outpatient prescriptions on file.     No current facility-administered medications for this visit.      ALLERGIES  Allergies: Patient has no known allergies.  PAST MEDICAL HISTORY  Past Medical History:   Diagnosis Date   • Nephrolithiasis 2011   • Insomnia    • OSTEOPOROSIS      SURGICAL HISTORY  She  has a past surgical history that includes colonoscopy (2007).  SOCIAL HISTORY  Social History   Substance Use Topics   • Smoking status: Former Smoker     Packs/day: 1.00     Years: 20.00     Quit date: 12/26/1995   • Smokeless tobacco: Never Used   • Alcohol use  "0.0 oz/week     Social History     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     Has to step-children, adult.      FAMILY HISTORY  Family History   Problem Relation Age of Onset   • Cancer Mother         lung   • Stroke Father    • Hyperlipidemia Neg Hx    • Heart Disease Neg Hx    • Hypertension Neg Hx      Family Status   Relation Status   • Mo (Not Specified)   • Fa (Not Specified)   • Neg Hx (Not Specified)       ROS   Constitutional: Negative for fever, chills.  HENT: Negative for congestion, sore throat.  Eyes: Negative for blurred vision.   Respiratory: Negative for cough, shortness of breath.  Cardiovascular: Negative for chest pain, palpitations.   Gastrointestinal: Negative for heartburn, abdominal pain. And per HPI.  Genitourinary: as above.  Musculoskeletal: Negative for myalgias, back pain and joint pain.   Skin: Negative for rash and itching.   Neuro: Negative for dizziness, weakness and headaches.   Endo/Heme/Allergies: Does not bruise/bleed easily.   Psychiatric/Behavioral: Negative for depression. And per HPI.    PHYSICAL EXAM   Blood pressure 102/62, pulse 82, temperature 36.6 °C (97.8 °F), temperature source Temporal, height 1.651 m (5' 5\"), weight 54.4 kg (120 lb), SpO2 97 %, not currently breastfeeding. Body mass index is 19.97 kg/m².  General:  NAD, well appearing  HEENT:   NC/AT, PERRLA, EOMI, TMs are clear. Oropharyngeal mucosa is pink,  without lesions;  no cervical / supraclavicular  lymphadenopathy, no thyromegaly.    Cardiovascular: RRR.   No m/r/g. No carotid bruits .      Lungs:   CTAB, no w/r/r, no respiratory distress.  Abdomen: Soft, NT/ND + BS; no suprapubic tenderness; no masses or hepatosplenomegaly.  Extremities:  2+ DP and radial pulses bilaterally.  No c/c/e.   Skin:  Warm, dry.  No erythema. No rash.   Neurologic: Alert & oriented x 3. CN II-XII grossly intact. Brachioradialis / knee DTR are 2/4, symmetric. Strength and " sensation grossly intact.  No focal deficits.  Psychiatric:  Affect normal, mood normal, judgment normal.    LABS     Labs are reviewed and discussed with a patient  Lab Results   Component Value Date/Time    CHOLSTRLTOT 151 07/05/2018 08:00 AM    LDL 57 07/05/2018 08:00 AM    HDL 85 07/05/2018 08:00 AM    TRIGLYCERIDE 46 07/05/2018 08:00 AM       Lab Results   Component Value Date/Time    SODIUM 139 07/05/2018 08:00 AM    POTASSIUM 3.8 07/05/2018 08:00 AM    CHLORIDE 106 07/05/2018 08:00 AM    CO2 27 07/05/2018 08:00 AM    GLUCOSE 75 07/05/2018 08:00 AM    BUN 15 07/05/2018 08:00 AM    CREATININE 0.89 07/05/2018 08:00 AM     Lab Results   Component Value Date/Time    ALKPHOSPHAT 76 07/05/2018 08:00 AM    ASTSGOT 18 07/05/2018 08:00 AM    ALTSGPT 14 07/05/2018 08:00 AM    TBILIRUBIN 0.3 07/05/2018 08:00 AM      Lab Results   Component Value Date/Time    HBA1C 5.4 05/12/2016 09:30 AM     No results found for: TSH  Lab Results   Component Value Date/Time    FREET4 1.24 02/11/2014 06:34 AM       Lab Results   Component Value Date/Time    WBC 5.1 05/12/2016 09:30 AM    RBC 4.66 05/12/2016 09:30 AM    HEMOGLOBIN 13.6 05/12/2016 09:30 AM    HEMATOCRIT 41.8 05/12/2016 09:30 AM    MCV 89.7 05/12/2016 09:30 AM    MCH 29.2 05/12/2016 09:30 AM    MCHC 32.5 (L) 05/12/2016 09:30 AM    MPV 10.1 05/12/2016 09:30 AM    NEUTSPOLYS 47.7 11/12/2013 07:36 AM    LYMPHOCYTES 44.0 (H) 11/12/2013 07:36 AM    MONOCYTES 6.7 11/12/2013 07:36 AM    EOSINOPHILS 1.1 11/12/2013 07:36 AM    BASOPHILS 0.5 11/12/2013 07:36 AM    HYPOCHROMIA 1+ 11/12/2013 07:36 AM      IMAGING     None    ASSESMENT AND PLAN        1. Dyslipidemia  Discussed treatment options.   Declined statin.  Advised low blayne diet, daily exercise.  - COMP METABOLIC PANEL; Future  - Lipid Profile; Future    2. Sleep disorder  Improved, no prescription meds    3. Atrophic vaginitis  Advised vaginal moisturizer.    4. Cervical high risk HPV (human papillomavirus) test positive  PAP  smear yearly at least 3 times    5. Polyp of colon, unspecified part of colon, unspecified type  Colonoscopy f/u after 5 yrs     6. Health care maintenance  Advised immunizations x 3    Counseling:   - Smoking:  Nonsmoker    Followup: Return in about 8 months (around 9/17/2019).  PAP    All questions are answered.    Please note that this dictation was created using voice recognition software, and that there might be errors of naty and possibly content.

## 2019-11-16 ENCOUNTER — HOSPITAL ENCOUNTER (OUTPATIENT)
Dept: LAB | Facility: MEDICAL CENTER | Age: 64
End: 2019-11-16
Attending: INTERNAL MEDICINE
Payer: COMMERCIAL

## 2019-11-16 DIAGNOSIS — E78.5 DYSLIPIDEMIA: ICD-10-CM

## 2019-11-16 LAB
ALBUMIN SERPL BCP-MCNC: 4 G/DL (ref 3.2–4.9)
ALBUMIN/GLOB SERPL: 1.5 G/DL
ALP SERPL-CCNC: 64 U/L (ref 30–99)
ALT SERPL-CCNC: 10 U/L (ref 2–50)
ANION GAP SERPL CALC-SCNC: 8 MMOL/L (ref 0–11.9)
AST SERPL-CCNC: 15 U/L (ref 12–45)
BILIRUB SERPL-MCNC: 0.5 MG/DL (ref 0.1–1.5)
BUN SERPL-MCNC: 13 MG/DL (ref 8–22)
CALCIUM SERPL-MCNC: 9.1 MG/DL (ref 8.5–10.5)
CHLORIDE SERPL-SCNC: 106 MMOL/L (ref 96–112)
CHOLEST SERPL-MCNC: 240 MG/DL (ref 100–199)
CO2 SERPL-SCNC: 26 MMOL/L (ref 20–33)
CREAT SERPL-MCNC: 0.83 MG/DL (ref 0.5–1.4)
FASTING STATUS PATIENT QL REPORTED: NORMAL
GLOBULIN SER CALC-MCNC: 2.6 G/DL (ref 1.9–3.5)
GLUCOSE SERPL-MCNC: 81 MG/DL (ref 65–99)
HDLC SERPL-MCNC: 111 MG/DL
LDLC SERPL CALC-MCNC: 116 MG/DL
POTASSIUM SERPL-SCNC: 3.8 MMOL/L (ref 3.6–5.5)
PROT SERPL-MCNC: 6.6 G/DL (ref 6–8.2)
SODIUM SERPL-SCNC: 140 MMOL/L (ref 135–145)
TRIGL SERPL-MCNC: 64 MG/DL (ref 0–149)

## 2019-11-16 PROCEDURE — 36415 COLL VENOUS BLD VENIPUNCTURE: CPT

## 2019-11-16 PROCEDURE — 80053 COMPREHEN METABOLIC PANEL: CPT

## 2019-11-16 PROCEDURE — 80061 LIPID PANEL: CPT

## 2019-11-18 ENCOUNTER — OFFICE VISIT (OUTPATIENT)
Dept: MEDICAL GROUP | Facility: MEDICAL CENTER | Age: 64
End: 2019-11-18

## 2019-11-18 ENCOUNTER — HOSPITAL ENCOUNTER (OUTPATIENT)
Facility: MEDICAL CENTER | Age: 64
End: 2019-11-18
Attending: INTERNAL MEDICINE
Payer: COMMERCIAL

## 2019-11-18 ENCOUNTER — APPOINTMENT (OUTPATIENT)
Dept: MEDICAL GROUP | Facility: MEDICAL CENTER | Age: 64
End: 2019-11-18
Payer: COMMERCIAL

## 2019-11-18 VITALS
TEMPERATURE: 98.7 F | HEART RATE: 86 BPM | BODY MASS INDEX: 18.73 KG/M2 | OXYGEN SATURATION: 97 % | RESPIRATION RATE: 12 BRPM | WEIGHT: 112.43 LBS | HEIGHT: 65 IN | SYSTOLIC BLOOD PRESSURE: 112 MMHG | DIASTOLIC BLOOD PRESSURE: 68 MMHG

## 2019-11-18 DIAGNOSIS — R87.810 CERVICAL HIGH RISK HPV (HUMAN PAPILLOMAVIRUS) TEST POSITIVE: ICD-10-CM

## 2019-11-18 DIAGNOSIS — Z12.4 SCREENING FOR MALIGNANT NEOPLASM OF CERVIX: ICD-10-CM

## 2019-11-18 DIAGNOSIS — E78.5 DYSLIPIDEMIA: ICD-10-CM

## 2019-11-18 DIAGNOSIS — K63.5 POLYP OF COLON, UNSPECIFIED PART OF COLON, UNSPECIFIED TYPE: ICD-10-CM

## 2019-11-18 DIAGNOSIS — Z11.59 NEED FOR HEPATITIS C SCREENING TEST: ICD-10-CM

## 2019-11-18 DIAGNOSIS — Z01.419 WELL FEMALE EXAM WITH ROUTINE GYNECOLOGICAL EXAM: ICD-10-CM

## 2019-11-18 DIAGNOSIS — Z00.00 HEALTH CARE MAINTENANCE: ICD-10-CM

## 2019-11-18 PROCEDURE — 88175 CYTOPATH C/V AUTO FLUID REDO: CPT

## 2019-11-18 PROCEDURE — 87624 HPV HI-RISK TYP POOLED RSLT: CPT

## 2019-11-18 PROCEDURE — 99396 PREV VISIT EST AGE 40-64: CPT | Performed by: INTERNAL MEDICINE

## 2019-11-19 DIAGNOSIS — Z01.419 WELL FEMALE EXAM WITH ROUTINE GYNECOLOGICAL EXAM: ICD-10-CM

## 2019-11-19 DIAGNOSIS — R87.810 CERVICAL HIGH RISK HPV (HUMAN PAPILLOMAVIRUS) TEST POSITIVE: ICD-10-CM

## 2019-11-19 DIAGNOSIS — Z12.4 SCREENING FOR MALIGNANT NEOPLASM OF CERVIX: ICD-10-CM

## 2019-11-19 NOTE — PROGRESS NOTES
S:  Josette Gutiérrez is a 64 y.o.,femalewho presents today for her Pap and GYN exam.      She has not had an Abnormal PAP previously  - Positive cervical high risk HPV in 2018   - has atrophic vaginitis  Mammogram:  decined.  Vitamin D supplement: advised  Calcium intake: advised    Dyslipidemia  Meds: none.  Diet: healthy  Exercise:  daily  BMI: 18  FH: unknown.      Colon polyps  Found on colonoscopy from 17, requested follow-up after 5 years.  Denies blood in a stool, anorexia, WT loss.    Current medicines (including changes today)  She  has a past medical history of Insomnia, Nephrolithiasis (), and OSTEOPOROSIS. She also has no past medical history of Breast cancer (HCC).  She  has a past surgical history that includes colonoscopy ().  Social History     Tobacco Use   • Smoking status: Former Smoker     Packs/day: 1.00     Years: 20.00     Pack years: 20.00     Last attempt to quit: 1995     Years since quittin.9   • Smokeless tobacco: Never Used   Substance Use Topics   • Alcohol use: Yes     Alcohol/week: 0.0 oz   • Drug use: No     Social History     Patient does not qualify to have social determinant information on file (likely too young).   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     Has to step-children, adult.      Family History   Problem Relation Age of Onset   • Cancer Mother         lung   • Stroke Father    • Hyperlipidemia Neg Hx    • Heart Disease Neg Hx    • Hypertension Neg Hx      Family Status   Relation Name Status   • Mo  (Not Specified)   • Fa  (Not Specified)   • Neg Hx  (Not Specified)     Allergies: Patient has no known allergies.    ROS  Constitutional: Negative for fever, chills and weight loss.   HEENT: Negative for headaches, blurred vision.   Respiratory: Negative for cough and shortness of breath.   CVS: Negative for chest pain, palpitations, irregular heart beats, exertional dyspnea.  "  Gastrointestinal: Negative for heartburn and abdominal pain.   Genitourinary: Negative for dysuria.   Musculoskeletal: Negative for myalgias, joint pain.   GYN   No abnormal bleeding, pelvic pain or discharge.  No breast pain or new / enlarging lumps on self exam    O: Blood Pressure 112/68 (BP Location: Right arm, Patient Position: Sitting, BP Cuff Size: Adult)   Pulse 86   Temperature 37.1 °C (98.7 °F) (Temporal)   Respiration 12   Height 1.651 m (5' 5\")   Weight 51 kg (112 lb 7 oz)   Oxygen Saturation 97%   Body Mass Index 18.71 kg/m²   Gen:  Alert and oriented, No apparent distress.  HEENT: PERRL, EOMI. Sclerae anicteric. Oropharyngeal mucosa is moist.  TMs are pearly gray B/L.   Neck:  No Lymphadenopathy,Thyromegaly,  Bruits.  Lungs: Clear to auscultation bilaterally  CV: Regular rate and rhythm. No murmurs, rubs or gallops.  Breasts: examined seated and supine. No skin changes, peau d'orange or nipple            retraction. No axillary or supraclavicular adenopathy.   No dominant, discrete, fixed, or suspicious masses found.    Abd: Soft non tender, non distended. Positive bowel sounds. No hepatosplenomegaly,  pulsatile masses.                   Ext: No clubbing, cyanosis, edema.  GYN:  No suprapubic tenderness.  Perineum and external genitalia normal, without rash.   Vagina without discharge, mucosa is atrophic.  Cervix w/o visible lesions or discharge. No CMT  Uterus normal size, non-tender, no masses  Adnexa w/o mass or tenderness.   PAP smear was obtained.    Assessment and plan    1. Well female exam with routine gynecological exam  - THINPREP PAP WITH HPV; Future    2. Health care maintenance  Per HPI;   Declined mammogram    3. Screening for malignant neoplasm of cervix  - THINPREP PAP WITH HPV; Future    4. Cervical high risk HPV (human papillomavirus) test positive  - THINPREP PAP WITH HPV; Future    5. Need for hepatitis C screening test  - HEP C VIRUS ANTIBODY; Future    6. " Dyslipidemia  Discussed treatment options, patient prefers lifestyle modification    7. Polyp of colon, unspecified part of colon, unspecified type  Colonoscopy follow-up after 5 years, as per HPI    Follow up in 1 year and as needed      Please note that this dictation was created using voice recognition software. I have made every reasonable attempt to correct obvious errors, but I expect that there are errors of grammar and possibly content that I did not discover before finalizing the note.

## 2019-12-11 ENCOUNTER — TELEPHONE (OUTPATIENT)
Dept: MEDICAL GROUP | Facility: MEDICAL CENTER | Age: 64
End: 2019-12-11

## 2019-12-11 NOTE — TELEPHONE ENCOUNTER
Called [facility] for patient to call back to discuss results.  ----- Message from Benji Selby M.D. sent at 11/21/2019  7:05 PM PST -----  Please notify patient that Pap smear was negative, HPV test again positive; need to repeat Pap smear in 1 year.  Thank you, Dr. Scott

## 2021-03-03 DIAGNOSIS — Z23 NEED FOR VACCINATION: ICD-10-CM

## 2022-02-10 NOTE — LETTER
Appropriate mood and affect, pleasant October 17, 2017        Josette Gutiérrez  [address] [place] [address]          Dear Ms. Gutiérrez,    Your mammogram did not show any significant findings, according to the radiologist’s interpretation.    Please remember that some cancers (about 10-15%) cannot be found by mammography alone, and that early detection requires a combination of monthly self-examination, yearly physical examination, and periodic mammography.    The [employer] Society Guidelines recommend screening mammograms and physical breast examinations every year beginning at the age of 40.    Please continue regular breast self-examinations and report any changes that concern you, even before your next appointment.  If you have any further questions, please contact your doctor.        Sincerely,           Jasmin Hemphill  Electronically Signed